# Patient Record
Sex: MALE | Race: ASIAN | NOT HISPANIC OR LATINO | Employment: FULL TIME | URBAN - METROPOLITAN AREA
[De-identification: names, ages, dates, MRNs, and addresses within clinical notes are randomized per-mention and may not be internally consistent; named-entity substitution may affect disease eponyms.]

---

## 2017-01-17 ENCOUNTER — OFFICE VISIT (OUTPATIENT)
Dept: CARDIOLOGY | Facility: CLINIC | Age: 67
End: 2017-01-17

## 2017-01-17 VITALS
WEIGHT: 170 LBS | HEART RATE: 78 BPM | BODY MASS INDEX: 28.32 KG/M2 | HEIGHT: 65 IN | DIASTOLIC BLOOD PRESSURE: 80 MMHG | SYSTOLIC BLOOD PRESSURE: 124 MMHG

## 2017-01-17 DIAGNOSIS — I10 ESSENTIAL HYPERTENSION: ICD-10-CM

## 2017-01-17 DIAGNOSIS — I48.19 PERSISTENT ATRIAL FIBRILLATION (HCC): Primary | ICD-10-CM

## 2017-01-17 PROCEDURE — 93000 ELECTROCARDIOGRAM COMPLETE: CPT | Performed by: INTERNAL MEDICINE

## 2017-01-17 PROCEDURE — 99213 OFFICE O/P EST LOW 20 MIN: CPT | Performed by: INTERNAL MEDICINE
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
## 2017-01-17 NOTE — MR AVS SNAPSHOT
Jamel Amira   1/17/2017 8:45 AM   Office Visit    Dept Phone:  [phone]   Encounter #:  [id number]    Provider:  Otoniel Machado MD   Department:  [facility] CARDIOLOGY                Your Full Care Plan              Your Updated Medication List          This list is accurate as of: 1/17/17  9:16 AM.  Always use your most recent med list.                amLODIPine 10 MG tablet   Commonly known as:  NORVASC       apixaban 5 MG tablet tablet   Commonly known as:  ELIQUIS       atorvastatin 40 MG tablet   Commonly known as:  LIPITOR       balsalazide 750 MG capsule   Commonly known as:  COLAZAL       econazole nitrate 1 % cream   Commonly known as:  SPECTAZOLE       fenofibrate 160 MG tablet       flecainide 50 MG tablet   Commonly known as:  TAMBOCOR   Take 1 tablet by mouth 2 (Two) Times a Day.       glipiZIDE 5 MG tablet   Commonly known as:  GLUCOTROL       hydrocortisone 1 % cream       losartan-hydrochlorothiazide 100-12.5 MG per tablet   Commonly known as:  HYZAAR       omeprazole 40 MG capsule   Commonly known as:  priLOSEC       UCERIS 9 MG tablet sustained-release 24 hour   Generic drug:  Budesonide               We Performed the Following     ECG 12 Lead       You Were Diagnosed With        Codes Comments    Persistent atrial fibrillation    -  Primary ICD-10-CM: I48.1  ICD-9-CM: 427.31     Essential hypertension     ICD-10-CM: I10  ICD-9-CM: 401.9       Instructions     None    Patient Instructions History      Upcoming Appointments     Visit Type Date Time Department    FOLLOW UP 1/17/2017  8:45 AM [facility]    FOLLOW UP 8/1/2017  8:15 AM [facility]      [facility]     Our records indicate that you have an active [employer] account.    You can view your After Visit Summary by going to [employer] and logging in with your [employer] username and password.  If you don't have a [employer] username and password  "but a parent or guardian has access to your record, the parent or guardian should login with their own [employer] username and password and access your record to view the After Visit Summary.    If you have questions, you can email [email or web address] or call [phone] to talk to our [employer] staff.  Remember, [employer] is NOT to be used for urgent needs.  For medical emergencies, dial 911.               Other Info from Your Visit           Your Appointments     Aug 01, 2017  8:15 AM EDT   Follow Up with Otoniel Machado MD   [facility] (--)    [address] [place] [address] [address]  [facility] [address] [phone]           Arrive 15 minutes prior to appointment.              Allergies     Lisinopril  Cough      Reason for Visit     Atrial Fibrillation           Vital Signs     Blood Pressure Pulse Height Weight Body Mass Index Smoking Status    124/80 (BP Location: Left arm, Patient Position: Sitting) 78 65\" (165.1 cm) 170 lb (77.1 kg) 28.29 kg/m2 Never Smoker      Problems and Diagnoses Noted     Atrial fibrillation (irregular heartbeat)    High blood pressure        "
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
## 2017-01-24 RX ORDER — FLECAINIDE ACETATE 50 MG/1
50 TABLET ORAL 2 TIMES DAILY
Qty: 180 TABLET | Refills: 2 | Status: SHIPPED | OUTPATIENT
Start: 2017-01-24 | End: 2018-06-28 | Stop reason: SDUPTHER

## 2017-08-09 ENCOUNTER — OFFICE VISIT (OUTPATIENT)
Dept: CARDIOLOGY | Facility: CLINIC | Age: 67
End: 2017-08-09

## 2017-08-09 VITALS
HEART RATE: 58 BPM | BODY MASS INDEX: 27.96 KG/M2 | SYSTOLIC BLOOD PRESSURE: 128 MMHG | DIASTOLIC BLOOD PRESSURE: 78 MMHG | WEIGHT: 167.8 LBS | HEIGHT: 65 IN

## 2017-08-09 DIAGNOSIS — I10 ESSENTIAL HYPERTENSION: ICD-10-CM

## 2017-08-09 DIAGNOSIS — I48.19 PERSISTENT ATRIAL FIBRILLATION (HCC): Primary | ICD-10-CM

## 2017-08-09 PROCEDURE — 93291 INTERROG DEV EVAL SCRMS IP: CPT | Performed by: PHYSICIAN ASSISTANT

## 2017-08-09 PROCEDURE — 99213 OFFICE O/P EST LOW 20 MIN: CPT | Performed by: PHYSICIAN ASSISTANT

## 2017-08-09 RX ORDER — ASPIRIN 81 MG/1
81 TABLET ORAL DAILY
COMMUNITY
End: 2020-01-21
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
## 2018-02-28 ENCOUNTER — OFFICE VISIT (OUTPATIENT)
Dept: CARDIOLOGY | Facility: CLINIC | Age: 68
End: 2018-02-28

## 2018-02-28 VITALS
SYSTOLIC BLOOD PRESSURE: 110 MMHG | BODY MASS INDEX: 27.82 KG/M2 | DIASTOLIC BLOOD PRESSURE: 70 MMHG | WEIGHT: 167 LBS | HEIGHT: 65 IN | HEART RATE: 80 BPM

## 2018-02-28 DIAGNOSIS — I48.0 PAROXYSMAL ATRIAL FIBRILLATION (HCC): Primary | ICD-10-CM

## 2018-02-28 DIAGNOSIS — I10 ESSENTIAL HYPERTENSION: ICD-10-CM

## 2018-02-28 PROCEDURE — 99213 OFFICE O/P EST LOW 20 MIN: CPT | Performed by: INTERNAL MEDICINE

## 2018-02-28 PROCEDURE — 93000 ELECTROCARDIOGRAM COMPLETE: CPT | Performed by: INTERNAL MEDICINE

## 2018-02-28 NOTE — PROGRESS NOTES
Jamel Collier  1950  [phone]      02/28/2018    [facility] CARDIOLOGY     Atiya Kenny MD  [address] [facility] [address]    Chief Complaint   Patient presents with   • Atrial Fibrillation       Problem List:  1. Paroxysmal Atrial fibrillation:  a. Initial diagnosis 1998 with subsequent antiarrhythmic therapy including flecainide, sotalol (bradycardia) and Tikosyn (ineffective).  b. CHADS-VASc score of 3 (age, hypertension, diabetes).  c. Echo: 5/11/16: LA size 5 cm, NL LVEF, Mild AI  d. EPS with RFA of LA and RA rotors, PVI, LA septum and roof 6/2016  e. NSR with first degree AVB [place] interval 260 ms after PVA 6/2016 with recurrent afib 2 weeks later  f. 11/3/2016 ECV for Afib - on Tambocor 50mg BID   g. Loop recorder implant 4/2017 - Dr. Adair  2. Hypertension.  3. Diabetes.  4. Obstructive sleep apnea--on CPAP.  5. History of rectal cancer.   6. Crohn's disease.   7. Hyperlipidemia.    Allergies  Allergies   Allergen Reactions   • Lisinopril Cough       Current Medications    Current Outpatient Prescriptions:   •  amLODIPine (NORVASC) 10 MG tablet, Take 10 mg by mouth Daily., Disp: , Rfl:   •  aspirin 81 MG EC tablet, Take 81 mg by mouth Daily., Disp: , Rfl:   •  atorvastatin (LIPITOR) 40 MG tablet, Take 40 mg by mouth daily., Disp: , Rfl:   •  balsalazide (COLAZAL) 750 MG capsule, Take 2,250 mg by mouth 2 (two) times a day., Disp: , Rfl:   •  Budesonide (UCERIS) 9 MG tablet sustained-release 24 hour, Take 9 mg by mouth daily., Disp: , Rfl:   •  flecainide (TAMBOCOR) 50 MG tablet, Take 1 tablet by mouth 2 (Two) Times a Day., Disp: 180 tablet, Rfl: 2  •  glipiZIDE (GLUCOTROL) 5 MG tablet, Take 10 mg by mouth Daily., Disp: , Rfl:   •  losartan-hydrochlorothiazide (HYZAAR) 100-12.5 MG per tablet, Take 1 tablet by mouth Daily., Disp: , Rfl:   •  omeprazole (PriLOSEC) 40 MG capsule, Take 40 mg by mouth daily., Disp: , Rfl:     History of Present Illness   HPI    Pt  "presents for follow up of atrial fibrillation, loop recorder interrogation, and HTN. Since we last saw the pt, pt denies any AF episodes, SOB, CP, LH, and dizziness. Denies any hospitalizations, ER visits, bleeding, or TIA/CVA symptoms. Overall feels well. BP has been good at home. Of note, he wants to know if he can stop taking his Flecainide or if he should continue. Was off flecainide for one week without AF    ROS:  General:  Denies fatigue, weight gain or loss  Cardiovascular:  Denies CP, PND, syncope, near syncope, edema or palpitations.  Pulmonary:  Denies MCKEON, cough, or wheezing      Vitals:    02/28/18 0909   BP: 110/70   BP Location: Left arm   Patient Position: Sitting   Pulse: 80   Weight: 75.8 kg (167 lb)   Height: 165.1 cm (65\")     Body mass index is 27.79 kg/(m^2).  PE:  General: NAD  Neck: no JVD, no carotid bruits, no TM  Heart RRR, NL S1, S2, S4 present, no rubs, murmurs  Lungs: CTA, no wheezes, rhonchi, or rales  Abd: soft, non-tender, NL BS  Ext: No musculoskeletal deformities, no edema, cyanosis, or clubbing  Psych: normal mood and affect    Diagnostic Data:    Loop recorder check: normal function. No afib.       ECG 12 Lead  Date/Time: 2/28/2018 9:58 AM  Performed by: LINWOOD MACHADO  Authorized by: LINWOOD MACHADO   Comparison: compared with previous ECG from 9/8/2017  Similar to previous ECG  Rhythm: sinus rhythm  BPM: 70  Conduction: 1st degree            1. Paroxysmal atrial fibrillation    2. Essential hypertension          Plan:  1) AF- no recurrences on flecainide. Doing well. Would stay on meds for now as RFA was complex  Continue present medications.   2) Anticoagulation  Continue ASA. CHADSVasc = 3. PRN eliquis   3) HTN- controlled  Wt loss, exercise, salt reduction      F/up in 6 months    Scribed for iLnwood Machado MD by Henny Koroma PA-C. 2/28/2018  9:33 AM\    I, Linwood Machado MD, personally performed the services described in this documentation as scribed by the " above named individual in my presence, and it is both accurate and complete.  2/28/2018  9:41 AM

## 2018-06-28 RX ORDER — FLECAINIDE ACETATE 50 MG/1
TABLET ORAL
Qty: 180 TABLET | Refills: 2 | Status: SHIPPED | OUTPATIENT
Start: 2018-06-28 | End: 2019-04-01 | Stop reason: SDUPTHER

## 2018-12-27 ENCOUNTER — OFFICE VISIT (OUTPATIENT)
Dept: CARDIOLOGY | Facility: CLINIC | Age: 68
End: 2018-12-27

## 2018-12-27 VITALS
BODY MASS INDEX: 26.69 KG/M2 | HEIGHT: 65 IN | HEART RATE: 70 BPM | OXYGEN SATURATION: 99 % | DIASTOLIC BLOOD PRESSURE: 70 MMHG | WEIGHT: 160.2 LBS | SYSTOLIC BLOOD PRESSURE: 130 MMHG

## 2018-12-27 DIAGNOSIS — I10 ESSENTIAL HYPERTENSION: ICD-10-CM

## 2018-12-27 DIAGNOSIS — I48.0 PAROXYSMAL ATRIAL FIBRILLATION (HCC): Primary | ICD-10-CM

## 2018-12-27 PROCEDURE — 99213 OFFICE O/P EST LOW 20 MIN: CPT | Performed by: INTERNAL MEDICINE

## 2018-12-27 PROCEDURE — 93291 INTERROG DEV EVAL SCRMS IP: CPT | Performed by: INTERNAL MEDICINE

## 2018-12-27 RX ORDER — LOSARTAN POTASSIUM AND HYDROCHLOROTHIAZIDE 25; 100 MG/1; MG/1
1 TABLET ORAL DAILY
COMMUNITY
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
## 2019-04-02 RX ORDER — FLECAINIDE ACETATE 50 MG/1
TABLET ORAL
Qty: 180 TABLET | Refills: 3 | Status: SHIPPED | OUTPATIENT
Start: 2019-04-02 | End: 2020-03-11

## 2019-11-27 ENCOUNTER — CONSULT (OUTPATIENT)
Dept: SLEEP MEDICINE | Facility: HOSPITAL | Age: 69
End: 2019-11-27

## 2019-11-27 VITALS
WEIGHT: 155.2 LBS | OXYGEN SATURATION: 95 % | DIASTOLIC BLOOD PRESSURE: 71 MMHG | HEART RATE: 73 BPM | BODY MASS INDEX: 25.86 KG/M2 | HEIGHT: 65 IN | SYSTOLIC BLOOD PRESSURE: 135 MMHG

## 2019-11-27 DIAGNOSIS — G47.33 OBSTRUCTIVE SLEEP APNEA: Primary | ICD-10-CM

## 2019-11-27 PROCEDURE — 99203 OFFICE O/P NEW LOW 30 MIN: CPT | Performed by: INTERNAL MEDICINE

## 2019-11-27 RX ORDER — BLOOD SUGAR DIAGNOSTIC
STRIP MISCELLANEOUS
Refills: 1 | COMMUNITY
Start: 2019-10-02

## 2019-11-27 RX ORDER — HEPATITIS A AND HEPATITIS B (RECOMBINANT) VACCINE 720; 20 [IU]/ML; UG/ML
INJECTION, SUSPENSION INTRAMUSCULAR
Refills: 2 | COMMUNITY
Start: 2019-11-25 | End: 2020-01-21

## 2019-11-27 RX ORDER — LANCETS 30 GAUGE
EACH MISCELLANEOUS
Refills: 0 | COMMUNITY
Start: 2019-10-28
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
## 2020-01-21 ENCOUNTER — OFFICE VISIT (OUTPATIENT)
Dept: CARDIOLOGY | Facility: CLINIC | Age: 70
End: 2020-01-21

## 2020-01-21 VITALS
OXYGEN SATURATION: 97 % | DIASTOLIC BLOOD PRESSURE: 70 MMHG | HEIGHT: 65 IN | BODY MASS INDEX: 26.76 KG/M2 | SYSTOLIC BLOOD PRESSURE: 130 MMHG | HEART RATE: 83 BPM | WEIGHT: 160.6 LBS

## 2020-01-21 DIAGNOSIS — I10 ESSENTIAL HYPERTENSION: ICD-10-CM

## 2020-01-21 DIAGNOSIS — I48.0 PAROXYSMAL ATRIAL FIBRILLATION (HCC): Primary | ICD-10-CM

## 2020-01-21 DIAGNOSIS — G47.33 OBSTRUCTIVE SLEEP APNEA: ICD-10-CM

## 2020-01-21 PROCEDURE — 99213 OFFICE O/P EST LOW 20 MIN: CPT | Performed by: PHYSICIAN ASSISTANT

## 2020-01-21 PROCEDURE — 93291 INTERROG DEV EVAL SCRMS IP: CPT | Performed by: PHYSICIAN ASSISTANT

## 2020-01-21 RX ORDER — AMLODIPINE BESYLATE 10 MG/1
TABLET ORAL DAILY
COMMUNITY
Start: 2019-12-26

## 2020-03-11 RX ORDER — FLECAINIDE ACETATE 50 MG/1
TABLET ORAL
Qty: 180 TABLET | Refills: 3 | Status: SHIPPED | OUTPATIENT
Start: 2020-03-11

## 2020-11-30 ENCOUNTER — OFFICE VISIT (OUTPATIENT)
Dept: SLEEP MEDICINE | Facility: HOSPITAL | Age: 70
End: 2020-11-30

## 2020-11-30 VITALS
SYSTOLIC BLOOD PRESSURE: 119 MMHG | HEIGHT: 65 IN | WEIGHT: 151.8 LBS | OXYGEN SATURATION: 97 % | DIASTOLIC BLOOD PRESSURE: 65 MMHG | BODY MASS INDEX: 25.29 KG/M2 | HEART RATE: 73 BPM

## 2020-11-30 DIAGNOSIS — G47.33 OBSTRUCTIVE SLEEP APNEA: Primary | ICD-10-CM

## 2020-11-30 PROCEDURE — 99212 OFFICE O/P EST SF 10 MIN: CPT | Performed by: NURSE PRACTITIONER
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
## 2021-12-22 ENCOUNTER — OFFICE VISIT (OUTPATIENT)
Dept: SLEEP MEDICINE | Facility: HOSPITAL | Age: 71
End: 2021-12-22

## 2021-12-22 VITALS
SYSTOLIC BLOOD PRESSURE: 120 MMHG | WEIGHT: 151.2 LBS | HEART RATE: 77 BPM | OXYGEN SATURATION: 98 % | BODY MASS INDEX: 25.81 KG/M2 | DIASTOLIC BLOOD PRESSURE: 73 MMHG | HEIGHT: 64 IN

## 2021-12-22 DIAGNOSIS — G47.33 OSA (OBSTRUCTIVE SLEEP APNEA): Primary | ICD-10-CM

## 2021-12-22 PROCEDURE — 99213 OFFICE O/P EST LOW 20 MIN: CPT | Performed by: NURSE PRACTITIONER

## 2021-12-22 RX ORDER — ALENDRONATE SODIUM 10 MG/1
TABLET ORAL
COMMUNITY
Start: 2021-08-20

## 2021-12-22 RX ORDER — NIFEDIPINE 30 MG/1
TABLET, FILM COATED, EXTENDED RELEASE ORAL
COMMUNITY
Start: 2021-09-13

## 2021-12-22 RX ORDER — LATANOPROST 50 UG/ML
SOLUTION/ DROPS OPHTHALMIC
COMMUNITY
Start: 2021-12-03

## 2021-12-22 RX ORDER — DORZOLAMIDE HYDROCHLORIDE AND TIMOLOL MALEATE 20; 5 MG/ML; MG/ML
SOLUTION/ DROPS OPHTHALMIC
COMMUNITY
Start: 2021-07-03

## 2021-12-22 RX ORDER — ICOSAPENT ETHYL 1000 MG/1
1 CAPSULE ORAL
COMMUNITY
Start: 2021-11-04

## 2021-12-22 RX ORDER — ALFUZOSIN HYDROCHLORIDE 10 MG/1
TABLET, EXTENDED RELEASE ORAL
COMMUNITY

## 2021-12-22 NOTE — PROGRESS NOTES
Chief Complaint:   Chief Complaint   Patient presents with   • Follow-up       HPI:    Jamel Collier is a 71 y.o. male here for follow-up of sleep apnea.  Patient was last seen 11/30/2020.  Patient continues to do well with sleep therapy.  He is sleeping 6 to 9 hours nightly and does feel rested upon awakening.  He will go to sleep within 30 minutes and does get up x2 in the night.  Patient has an [place] score of 2/24.  We did discuss 73 out of the last 90 days of use and patient states he was traveling without his CPAP.  He is having no concerns or complaints regarding CPAP use and wishes to continue.        Current medications are:   Current Outpatient Medications:   •  alendronate (FOSAMAX) 10 MG tablet, TAKE 1 TABLET WITH 8 OZ OF WATER 1/2 HR BEFORE BREAKFAST DAILY. SIT UP AND DON'T EAT FOR 1/2 HOUR, Disp: , Rfl:   •  alfuzosin (UROXATRAL) 10 MG 24 hr tablet, alfuzosin ER 10 mg tablet,extended release 24 hr  TAKE 1 TABLET DAILY, Disp: , Rfl:   •  amLODIPine (NORVASC) 10 MG tablet, Daily., Disp: , Rfl:   •  apixaban (ELIQUIS) 5 MG tablet tablet, Take 5 mg by mouth 2 (Two) Times a Day., Disp: , Rfl:   •  atorvastatin (LIPITOR) 40 MG tablet, Take 40 mg by mouth daily., Disp: , Rfl:   •  balsalazide (COLAZAL) 750 MG capsule, Take 3,000 mg by mouth 2 (Two) Times a Day., Disp: , Rfl:   •  Budesonide (UCERIS) 9 MG tablet sustained-release 24 hour, Take 9 mg by mouth daily., Disp: , Rfl:   •  dorzolamide-timolol (COSOPT) 22.3-6.8 MG/ML ophthalmic solution, , Disp: , Rfl:   •  Empagliflozin (JARDIANCE) 10 MG tablet, Take 1 tablet by mouth Daily., Disp: , Rfl:   •  flecainide (TAMBOCOR) 50 MG tablet, TAKE 1 TABLET TWICE A DAY, Disp: 180 tablet, Rfl: 3  •  glipiZIDE (GLUCOTROL) 5 MG tablet, Take  by mouth 2 (Two) Times a Day. 2 tabs in the am and 1 tab in the evening, Disp: , Rfl:   •  icosapent ethyl (VASCEPA) 1 g capsule capsule, Take 1 g by mouth., Disp: , Rfl:   •  Lancets (ONETOUCH DELICA PLUS [id number]) misc,  CHECK BLOOD GLUCOSE THREE TIMES A DAY, Disp: , Rfl: 0  •  latanoprost (XALATAN) 0.005 % ophthalmic solution, ADMINISTER ONE DROP TO BOTH AFFECTED EYES ONCE A DAY., Disp: , Rfl:   •  losartan-hydrochlorothiazide (HYZAAR) 100-25 MG per tablet, Take 1 tablet by mouth Daily., Disp: , Rfl:   •  metFORMIN (GLUCOPHAGE) 500 MG tablet, Take 500 mg by mouth 2 (Two) Times a Day., Disp: , Rfl: 3  •  NIFEdipine CC (ADALAT CC) 30 MG 24 hr tablet, nifedipine ER 30 mg tablet,extended release  Take 1 tablet every day by oral route., Disp: , Rfl:   •  Omeprazole 20 MG Tablet Delayed Release Dispersible, Take 20 mg by mouth Daily., Disp: , Rfl:   •  ONETOUCH VERIO test strip, use 1 strip to test blood glucose three times daily, Disp: , Rfl: 1  •  POTASSIUM CHLORIDE ER PO, Take 30 mg by mouth Daily., Disp: , Rfl: .      The patient's relevant past medical, surgical, family and social history were reviewed and updated in Epic as appropriate.       Review of Systems   HENT: Positive for tinnitus.    Eyes: Positive for visual disturbance.   Respiratory: Positive for apnea.    Cardiovascular: Positive for palpitations.   Gastrointestinal: Positive for diarrhea.   Endocrine: Positive for polydipsia.   Genitourinary: Positive for frequency.   Musculoskeletal: Positive for back pain.   Allergic/Immunologic: Positive for food allergies.   Psychiatric/Behavioral: Positive for sleep disturbance.   All other systems reviewed and are negative.        Objective:    Physical Exam  Constitutional:       Appearance: Normal appearance.   HENT:      Head: Normocephalic and atraumatic.      Mouth/Throat:      Comments: Mallampati 4 anatomy  Cardiovascular:      Rate and Rhythm: Regular rhythm.   Pulmonary:      Effort: Pulmonary effort is normal.      Breath sounds: Normal breath sounds.   Skin:     General: Skin is warm and dry.   Neurological:      Mental Status: He is alert and oriented to person, place, and time.   Psychiatric:         Mood and  Affect: Mood normal.         Behavior: Behavior normal.         Thought Content: Thought content normal.         Judgment: Judgment normal.       73/90 days of use  Greater than 4-hour use 57%  Setting of 10 cm H2O  AHI 1.1    ASSESSMENT/PLAN    Diagnoses and all orders for this visit:    1. SARIAH (obstructive sleep apnea) (Primary)  -     CPAP Therapy            1. Counseled patient regarding multimodal approach with healthy nutrition, healthy sleep, regular physical activity, social activities, counseling, and medications. Encouraged to practice lateral sleep position. Avoid alcohol and sedatives close to bedtime.  2. Refill supplies x1 year.  Return clinic 1 year or sooner symptoms warrant.    I have reviewed the results of my evaluation and impression and discussed my recommendations in detail with the patient.      Signed by  Lorna Kim, APRN    December 22, 2021      CC: Atiya Kenny MD          No ref. provider found

## 2023-02-14 ENCOUNTER — OFFICE VISIT (OUTPATIENT)
Dept: SLEEP MEDICINE | Facility: HOSPITAL | Age: 73
End: 2023-02-14
Payer: MEDICARE

## 2023-02-14 VITALS
WEIGHT: 151.8 LBS | HEART RATE: 60 BPM | BODY MASS INDEX: 25.92 KG/M2 | DIASTOLIC BLOOD PRESSURE: 57 MMHG | SYSTOLIC BLOOD PRESSURE: 120 MMHG | OXYGEN SATURATION: 96 % | HEIGHT: 64 IN

## 2023-02-14 DIAGNOSIS — G47.33 OSA (OBSTRUCTIVE SLEEP APNEA): Primary | ICD-10-CM

## 2023-02-14 PROCEDURE — 99213 OFFICE O/P EST LOW 20 MIN: CPT | Performed by: NURSE PRACTITIONER

## 2023-02-14 NOTE — PROGRESS NOTES
Chief Complaint:   Chief Complaint   Patient presents with   • Follow-up       HPI:    Jamel Collier is a 72 y.o. male here for follow-up of sleep apnea.  Patient last seen 12/22/2021.  Patient continues to do well with CPAP therapy.  Patient sleeps anywhere from 6 to 9 hours nightly and feels rested upon awakening.  Patient goes to sleep within 30 minutes and only gets up x2 in the night.  Patient has an [place] score of 51/24.  Patient states his machine is over 5 years old and wishes to have an order for a new one we will get that sent to his DME today.  We did discuss use of 66 out of the last 90 days of use patient states that is not correct as there is only been 1 to 2 days he has not used his machine.  He has no other concerns or complaints want to continue therapy.        Current medications are:   Current Outpatient Medications:   •  alendronate (FOSAMAX) 10 MG tablet, TAKE 1 TABLET WITH 8 OZ OF WATER 1/2 HR BEFORE BREAKFAST DAILY. SIT UP AND DON'T EAT FOR 1/2 HOUR, Disp: , Rfl:   •  alfuzosin (UROXATRAL) 10 MG 24 hr tablet, alfuzosin ER 10 mg tablet,extended release 24 hr  TAKE 1 TABLET DAILY, Disp: , Rfl:   •  amLODIPine (NORVASC) 10 MG tablet, Daily., Disp: , Rfl:   •  apixaban (ELIQUIS) 5 MG tablet tablet, Take 5 mg by mouth 2 (Two) Times a Day., Disp: , Rfl:   •  atorvastatin (LIPITOR) 40 MG tablet, Take 40 mg by mouth daily., Disp: , Rfl:   •  dorzolamide-timolol (COSOPT) 22.3-6.8 MG/ML ophthalmic solution, , Disp: , Rfl:   •  empagliflozin (JARDIANCE) 10 MG tablet tablet, Take 1 tablet by mouth Daily., Disp: , Rfl:   •  empagliflozin (JARDIANCE) 10 MG tablet tablet, Take 10 mg by mouth Daily., Disp: , Rfl:   •  flecainide (TAMBOCOR) 50 MG tablet, TAKE 1 TABLET TWICE A DAY, Disp: 180 tablet, Rfl: 3  •  icosapent ethyl (VASCEPA) 1 g capsule capsule, Take 1 g by mouth., Disp: , Rfl:   •  Lancets (ONETOUCH DELICA PLUS [id number]) misc, CHECK BLOOD GLUCOSE THREE TIMES A DAY, Disp: , Rfl: 0  •  latanoprost  (XALATAN) 0.005 % ophthalmic solution, ADMINISTER ONE DROP TO BOTH AFFECTED EYES ONCE A DAY., Disp: , Rfl:   •  losartan-hydrochlorothiazide (HYZAAR) 100-25 MG per tablet, Take 1 tablet by mouth Daily., Disp: , Rfl:   •  metFORMIN (GLUCOPHAGE) 500 MG tablet, Take 500 mg by mouth 2 (Two) Times a Day., Disp: , Rfl: 3  •  NIFEdipine CC (ADALAT CC) 30 MG 24 hr tablet, nifedipine ER 30 mg tablet,extended release  Take 1 tablet every day by oral route., Disp: , Rfl:   •  Omeprazole 20 MG Tablet Delayed Release Dispersible, Take 20 mg by mouth Daily., Disp: , Rfl:   •  ONETOUCH VERIO test strip, use 1 strip to test blood glucose three times daily, Disp: , Rfl: 1  •  POTASSIUM CHLORIDE ER PO, Take 30 mg by mouth Daily., Disp: , Rfl:   •  balsalazide (COLAZAL) 750 MG capsule, Take 3,000 mg by mouth 2 (Two) Times a Day., Disp: , Rfl:   •  Budesonide 9 MG tablet sustained-release 24 hour, Take 9 mg by mouth daily., Disp: , Rfl:   •  glipiZIDE (GLUCOTROL) 5 MG tablet, Take  by mouth 2 (Two) Times a Day. 2 tabs in the am and 1 tab in the evening, Disp: , Rfl: .      The patient's relevant past medical, surgical, family and social history were reviewed and updated in Epic as appropriate.       Review of Systems   HENT: Positive for tinnitus.    Eyes: Positive for visual disturbance.   Respiratory: Positive for apnea.    Cardiovascular: Positive for palpitations.   Gastrointestinal: Positive for diarrhea.   Endocrine: Positive for polydipsia.   Genitourinary: Positive for frequency.   Musculoskeletal: Positive for back pain.   Allergic/Immunologic: Positive for food allergies.   Psychiatric/Behavioral: Positive for sleep disturbance.   All other systems reviewed and are negative.        Objective:    Physical Exam  Constitutional:       Appearance: Normal appearance.   HENT:      Head: Normocephalic and atraumatic.      Mouth/Throat:      Comments: Mallampati 4 anatomy  Cardiovascular:      Rate and Rhythm: Normal rate and regular  rhythm.   Pulmonary:      Effort: Pulmonary effort is normal.      Breath sounds: Normal breath sounds.   Skin:     General: Skin is warm and dry.   Neurological:      Mental Status: He is alert and oriented to person, place, and time.   Psychiatric:         Mood and Affect: Mood normal.         Behavior: Behavior normal.         Thought Content: Thought content normal.         Judgment: Judgment normal.         CPAP Report    66 over 90 days of use  Greater than 4-hour use 57%  AHI of 1.4  CPAP 10 cm H2O  The patient continues to use and benefit from CPAP therapy.    ASSESSMENT/PLAN    Diagnoses and all orders for this visit:    1. SARIAH (obstructive sleep apnea) (Primary)  -     PAP Therapy        1. Counseled patient regarding multimodal approach with healthy nutrition, healthy sleep, regular physical activity, social activities, counseling, and medications. Encouraged to practice lateral sleep position. Avoid alcohol and sedatives close to bedtime.  2.   Fill supplies x1 year order for new machine setting 10 cm H2O I will see him back in 31 to 90 days.    I have reviewed the results of my evaluation and impression and discussed my recommendations in detail with the patient.      Signed by  CARRIE David    February 14, 2023      CC: Atiya Kenny MD         No ref. provider found

## 2023-07-25 ENCOUNTER — OFFICE VISIT (OUTPATIENT)
Dept: SLEEP MEDICINE | Facility: HOSPITAL | Age: 73
End: 2023-07-25
Payer: MEDICARE

## 2023-07-25 VITALS
OXYGEN SATURATION: 96 % | WEIGHT: 148.6 LBS | BODY MASS INDEX: 25.37 KG/M2 | HEIGHT: 64 IN | DIASTOLIC BLOOD PRESSURE: 65 MMHG | HEART RATE: 80 BPM | SYSTOLIC BLOOD PRESSURE: 124 MMHG

## 2023-07-25 DIAGNOSIS — G47.33 OSA (OBSTRUCTIVE SLEEP APNEA): Primary | ICD-10-CM

## 2023-07-25 PROCEDURE — 99213 OFFICE O/P EST LOW 20 MIN: CPT | Performed by: NURSE PRACTITIONER

## 2023-07-25 PROCEDURE — 3074F SYST BP LT 130 MM HG: CPT | Performed by: NURSE PRACTITIONER

## 2023-07-25 PROCEDURE — 3078F DIAST BP <80 MM HG: CPT | Performed by: NURSE PRACTITIONER

## 2023-07-25 NOTE — PROGRESS NOTES
Chief Complaint:   Chief Complaint   Patient presents with    Follow-up       HPI:    Jamel Collier is a 73 y.o. male here for follow-up of sleep apnea.  Patient was last seen 2/14/2023 and machine was 5+ years old patient did request that at that time for a new machine.  He is sleeping 6 to 9 hours nightly and does feel rested upon awakening.  Patient goes to sleep within 30 minutes and does get up x2.  Patient has an [place] score of 1/24.  Patient has no concerns or complaints today and wishes to continue CPAP therapy.        Current medications are:   Current Outpatient Medications:     alendronate (FOSAMAX) 10 MG tablet, TAKE 1 TABLET WITH 8 OZ OF WATER 1/2 HR BEFORE BREAKFAST DAILY. SIT UP AND DON'T EAT FOR 1/2 HOUR, Disp: , Rfl:     alfuzosin (UROXATRAL) 10 MG 24 hr tablet, alfuzosin ER 10 mg tablet,extended release 24 hr  TAKE 1 TABLET DAILY, Disp: , Rfl:     amLODIPine (NORVASC) 10 MG tablet, Daily., Disp: , Rfl:     apixaban (ELIQUIS) 5 MG tablet tablet, Take 1 tablet by mouth 2 (Two) Times a Day., Disp: , Rfl:     atorvastatin (LIPITOR) 40 MG tablet, Take 1 tablet by mouth Daily., Disp: , Rfl:     balsalazide (COLAZAL) 750 MG capsule, Take 4 capsules by mouth 2 (Two) Times a Day., Disp: , Rfl:     Budesonide 9 MG tablet sustained-release 24 hour, Take 9 mg by mouth daily., Disp: , Rfl:     dorzolamide-timolol (COSOPT) 22.3-6.8 MG/ML ophthalmic solution, , Disp: , Rfl:     empagliflozin (JARDIANCE) 10 MG tablet tablet, Take 1 tablet by mouth Daily., Disp: , Rfl:     empagliflozin (JARDIANCE) 10 MG tablet tablet, Take 1 tablet by mouth Daily., Disp: , Rfl:     flecainide (TAMBOCOR) 50 MG tablet, TAKE 1 TABLET TWICE A DAY, Disp: 180 tablet, Rfl: 3    icosapent ethyl (VASCEPA) 1 g capsule capsule, Take 1 g by mouth., Disp: , Rfl:     Lancets (ONETOUCH DELICA PLUS [id number]) misc, CHECK BLOOD GLUCOSE THREE TIMES A DAY, Disp: , Rfl: 0    latanoprost (XALATAN) 0.005 % ophthalmic solution, ADMINISTER ONE DROP TO  BOTH AFFECTED EYES ONCE A DAY., Disp: , Rfl:     losartan-hydrochlorothiazide (HYZAAR) 100-25 MG per tablet, Take 1 tablet by mouth Daily., Disp: , Rfl:     metFORMIN (GLUCOPHAGE) 500 MG tablet, Take 1 tablet by mouth 2 (Two) Times a Day., Disp: , Rfl: 3    NIFEdipine CC (ADALAT CC) 30 MG 24 hr tablet, nifedipine ER 30 mg tablet,extended release  Take 1 tablet every day by oral route., Disp: , Rfl:     Omeprazole 20 MG Tablet Delayed Release Dispersible, Take 1 tablet by mouth Daily., Disp: , Rfl:     ONETOUCH VERIO test strip, use 1 strip to test blood glucose three times daily, Disp: , Rfl: 1    POTASSIUM CHLORIDE ER PO, Take 30 mg by mouth Daily., Disp: , Rfl:     glipiZIDE (GLUCOTROL) 5 MG tablet, Take  by mouth 2 (Two) Times a Day. 2 tabs in the am and 1 tab in the evening (Patient not taking: Reported on 7/25/2023), Disp: , Rfl: .      The patient's relevant past medical, surgical, family and social history were reviewed and updated in Epic as appropriate.       Review of Systems   HENT:  Positive for tinnitus.    Eyes:  Positive for visual disturbance.   Respiratory:  Positive for apnea.    Cardiovascular:  Positive for palpitations.   Gastrointestinal:  Positive for diarrhea.   Endocrine: Positive for polydipsia.   Genitourinary:  Positive for frequency.   Musculoskeletal:  Positive for back pain.   Allergic/Immunologic: Positive for food allergies. Negative for environmental allergies.   Psychiatric/Behavioral:  Positive for sleep disturbance.    All other systems reviewed and are negative.      Objective:    Physical Exam  Constitutional:       Appearance: Normal appearance.   HENT:      Head: Normocephalic and atraumatic.      Mouth/Throat:      Comments: Mallampati 4 anatomy  Cardiovascular:      Rate and Rhythm: Normal rate and regular rhythm.   Pulmonary:      Effort: Pulmonary effort is normal. No respiratory distress.   Neurological:      Mental Status: He is alert and oriented to person, place, and  time.   Psychiatric:         Mood and Affect: Mood normal.         Behavior: Behavior normal.         Thought Content: Thought content normal.         Judgment: Judgment normal.       CPAP Report  57/63 days of use  Greater than 4-hour use 87%  CPAP 10 cm H2O  AHI 1.0    The patient continues to use and benefit from CPAP therapy.    ASSESSMENT/PLAN    Diagnoses and all orders for this visit:    1. SARIAH (obstructive sleep apnea) (Primary)  -     PAP Therapy        Counseled patient regarding multimodal approach with healthy nutrition, healthy sleep, regular physical activity, social activities, counseling, and medications. Encouraged to practice lateral sleep position. Avoid alcohol and sedatives close to bedtime.    Refill supplies x1 year.  Return to clinic 1 year sooner symptoms warrant.    I have reviewed the results of my evaluation and impression and discussed my recommendations in detail with the patient.      Signed by  Lorna Kim, CARRIE    July 25, 2023      CC: Atiya Kenny MD         No ref. provider found

## 2024-07-23 ENCOUNTER — OFFICE VISIT (OUTPATIENT)
Dept: SLEEP MEDICINE | Facility: CLINIC | Age: 74
End: 2024-07-23
Payer: MEDICARE

## 2024-07-23 VITALS
DIASTOLIC BLOOD PRESSURE: 82 MMHG | BODY MASS INDEX: 25.44 KG/M2 | HEIGHT: 64 IN | OXYGEN SATURATION: 98 % | WEIGHT: 149 LBS | HEART RATE: 65 BPM | SYSTOLIC BLOOD PRESSURE: 142 MMHG | TEMPERATURE: 97.7 F

## 2024-07-23 DIAGNOSIS — G47.33 OSA (OBSTRUCTIVE SLEEP APNEA): Primary | ICD-10-CM

## 2024-07-23 PROCEDURE — 99213 OFFICE O/P EST LOW 20 MIN: CPT | Performed by: NURSE PRACTITIONER

## 2024-07-23 PROCEDURE — 3077F SYST BP >= 140 MM HG: CPT | Performed by: NURSE PRACTITIONER

## 2024-07-23 PROCEDURE — 3079F DIAST BP 80-89 MM HG: CPT | Performed by: NURSE PRACTITIONER

## 2024-07-23 NOTE — PROGRESS NOTES
Chief Complaint:   Chief Complaint   Patient presents with    Follow-up    Sleep Apnea       HPI:    Jamel Collier is a 74 y.o. male here for follow-up of sleep apnea.  Patient was last seen 7/25/2023.  Patient continues to do well with CPAP therapy.  Patient is sleeping 6 to 9 hours nightly and feels rested upon awakening.  Patient goes to sleep within 30 minutes and does get up x 2.  Patient has an [place] score of 1/24.  Patient does well with heated tubing and nasal mask.  Patient has no concerns or complaints and will continue therapy.        Current medications are:   Current Outpatient Medications:     alendronate (FOSAMAX) 10 MG tablet, TAKE 1 TABLET WITH 8 OZ OF WATER 1/2 HR BEFORE BREAKFAST DAILY. SIT UP AND DON'T EAT FOR 1/2 HOUR, Disp: , Rfl:     alfuzosin (UROXATRAL) 10 MG 24 hr tablet, alfuzosin ER 10 mg tablet,extended release 24 hr  TAKE 1 TABLET DAILY, Disp: , Rfl:     amLODIPine (NORVASC) 10 MG tablet, Daily., Disp: , Rfl:     apixaban (ELIQUIS) 5 MG tablet tablet, Take 1 tablet by mouth 2 (Two) Times a Day., Disp: , Rfl:     atorvastatin (LIPITOR) 40 MG tablet, Take 1 tablet by mouth Daily., Disp: , Rfl:     balsalazide (COLAZAL) 750 MG capsule, Take 4 capsules by mouth 2 (Two) Times a Day., Disp: , Rfl:     Budesonide 9 MG tablet sustained-release 24 hour, Take 9 mg by mouth daily., Disp: , Rfl:     dorzolamide-timolol (COSOPT) 22.3-6.8 MG/ML ophthalmic solution, , Disp: , Rfl:     empagliflozin (JARDIANCE) 10 MG tablet tablet, Take 1 tablet by mouth Daily., Disp: , Rfl:     empagliflozin (JARDIANCE) 10 MG tablet tablet, Take 1 tablet by mouth Daily., Disp: , Rfl:     flecainide (TAMBOCOR) 50 MG tablet, TAKE 1 TABLET TWICE A DAY, Disp: 180 tablet, Rfl: 3    icosapent ethyl (VASCEPA) 1 g capsule capsule, Take 1 g by mouth., Disp: , Rfl:     Lancets (ONETOUCH DELICA PLUS [id number]) misc, CHECK BLOOD GLUCOSE THREE TIMES A DAY, Disp: , Rfl: 0    latanoprost (XALATAN) 0.005 % ophthalmic solution,  ADMINISTER ONE DROP TO BOTH AFFECTED EYES ONCE A DAY., Disp: , Rfl:     losartan-hydrochlorothiazide (HYZAAR) 100-25 MG per tablet, Take 1 tablet by mouth Daily., Disp: , Rfl:     metFORMIN (GLUCOPHAGE) 500 MG tablet, Take 1 tablet by mouth 2 (Two) Times a Day., Disp: , Rfl: 3    NIFEdipine CC (ADALAT CC) 30 MG 24 hr tablet, nifedipine ER 30 mg tablet,extended release  Take 1 tablet every day by oral route., Disp: , Rfl:     Omeprazole 20 MG Tablet Delayed Release Dispersible, Take 1 tablet by mouth Daily., Disp: , Rfl:     ONETOUCH VERIO test strip, use 1 strip to test blood glucose three times daily, Disp: , Rfl: 1    POTASSIUM CHLORIDE ER PO, Take 30 mg by mouth Daily., Disp: , Rfl:     glipiZIDE (GLUCOTROL) 5 MG tablet, Take  by mouth 2 (Two) Times a Day. 2 tabs in the am and 1 tab in the evening (Patient not taking: Reported on 7/25/2023), Disp: , Rfl: .      The patient's relevant past medical, surgical, family and social history were reviewed and updated in Epic as appropriate.       Review of Systems   HENT:  Positive for tinnitus.    Eyes:  Positive for visual disturbance.   Respiratory:  Positive for apnea.    Cardiovascular:  Positive for palpitations.   Gastrointestinal:  Positive for diarrhea.   Endocrine: Positive for polydipsia.   Genitourinary:  Positive for frequency.   Musculoskeletal:  Positive for back pain.   Allergic/Immunologic: Positive for food allergies.   Psychiatric/Behavioral:  Positive for sleep disturbance.    All other systems reviewed and are negative.        Objective:    Physical Exam  Constitutional:       Appearance: Normal appearance.   HENT:      Head: Normocephalic and atraumatic.      Mouth/Throat:      Comments: Mallampati 4 anatomy  Cardiovascular:      Rate and Rhythm: Normal rate and regular rhythm.   Pulmonary:      Effort: Pulmonary effort is normal.      Breath sounds: Normal breath sounds.   Skin:     General: Skin is warm and dry.   Neurological:      Mental Status:  He is alert and oriented to person, place, and time.   Psychiatric:         Mood and Affect: Mood normal.         Behavior: Behavior normal.         Thought Content: Thought content normal.         Judgment: Judgment normal.         CPAP Report  87/90 days of use  Greater than 4-hour use 88%  Setting 10 cm H2O  AHI of 0.9    The patient continues to use and benefit from CPAP therapy.    ASSESSMENT/PLAN    Diagnoses and all orders for this visit:    1. SARIAH (obstructive sleep apnea) (Primary)  -     PAP Therapy        Counseled patient regarding multimodal approach with healthy nutrition, healthy sleep, regular physical activity, social activities, counseling, and medications. Encouraged to practice lateral sleep position. Avoid alcohol and sedatives close to bedtime.      Refill supplies x 1 year.  Return to clinic 1 year or sooner if symptoms warrant.    Signed by  Lorna Kim, CARRIE    July 23, 2024      CC: Atiya Kenny MD         No ref. provider found